# Patient Record
Sex: FEMALE | Race: WHITE | NOT HISPANIC OR LATINO | URBAN - METROPOLITAN AREA
[De-identification: names, ages, dates, MRNs, and addresses within clinical notes are randomized per-mention and may not be internally consistent; named-entity substitution may affect disease eponyms.]

---

## 2018-01-14 ENCOUNTER — EMERGENCY (EMERGENCY)
Facility: HOSPITAL | Age: 28
LOS: 0 days | Discharge: HOME | End: 2018-01-14
Attending: EMERGENCY MEDICINE | Admitting: EMERGENCY MEDICINE
Payer: MEDICAID

## 2018-01-14 VITALS
HEART RATE: 92 BPM | OXYGEN SATURATION: 100 % | DIASTOLIC BLOOD PRESSURE: 115 MMHG | SYSTOLIC BLOOD PRESSURE: 158 MMHG | TEMPERATURE: 98 F | RESPIRATION RATE: 19 BRPM

## 2018-01-14 VITALS — HEIGHT: 65 IN | WEIGHT: 287.92 LBS
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
## 2018-01-14 DIAGNOSIS — S93.602A UNSPECIFIED SPRAIN OF LEFT FOOT, INITIAL ENCOUNTER: ICD-10-CM
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
## 2018-01-14 DIAGNOSIS — M79.672 PAIN IN LEFT FOOT: ICD-10-CM

## 2018-01-14 PROCEDURE — 99284 EMERGENCY DEPT VISIT MOD MDM: CPT

## 2018-01-14 PROCEDURE — 73610 X-RAY EXAM OF ANKLE: CPT | Mod: 26,RT

## 2018-01-14 PROCEDURE — 73630 X-RAY EXAM OF FOOT: CPT | Mod: 26,LT

## 2018-01-14 RX ORDER — IBUPROFEN 200 MG
800 TABLET ORAL ONCE
Qty: 0 | Refills: 0 | Status: COMPLETED | OUTPATIENT
Start: 2018-01-14 | End: 2018-01-14

## 2018-01-14 RX ADMIN — Medication 800 MILLIGRAM(S): at 23:08

## 2018-01-14 NOTE — ED PROVIDER NOTE - OBJECTIVE STATEMENT
28 y/o female presents ED s/p podiatry surgery (08/25) with Dr. Sullivan c/o left foot pain. Pt states she was walking today when she heard a pop from her foot. Pain has progressively become worse throughout the day, and she is unable to walk on her foot. Pt is able to flex and point her foot, moving it left and right causes pain. denies fever. denies HA or neck pain. no chest pain or sob. no abd pain. no n/v/d. no urinary f/u/d. no back pain. no motor or sensory deficits. denies illicit drug use. no recent travel. no rash. no other acute issues symptoms or concerns. Pt has had pedal swelling on her left leg since having her surgery. Pt states a few months ago she thought she had a DVT, but when tested results were negative. Allergic to Tylenol.

## 2018-01-14 NOTE — ED ADULT NURSE REASSESSMENT NOTE - NS ED NURSE REASSESS COMMENT FT1
2310: Patient provided with crutches, education on crutch usage and follow up care preformed. Patient to be discharged.

## 2018-01-14 NOTE — ED PROVIDER NOTE - MEDICAL DECISION MAKING DETAILS
known prior fx f/u with [facility] podioatrist rice therapy crutched provided non weight bearing advised

## 2018-01-14 NOTE — ED PROVIDER NOTE - MUSCULOSKELETAL, MLM
Spine appears normal, range of motion is not limited. +Left mid foot TTP and swelling. No sign of DVT.

## 2018-01-14 NOTE — ED ADULT NURSE NOTE - NS ED NURSE DC INFO COMPLEXITY
Verbalized Understanding/Patient asked questions/Returned Demonstration/Moderate: Comprehensive teaching

## 2018-01-14 NOTE — ED ADULT NURSE NOTE - OBJECTIVE STATEMENT
27 year old female presenting to the ED complaining of pain to her left foot/ankle. Patient states that she had foot/ankle surgery in August, and has remained in a boot since then. Patient reports that she felt "my foot flex downward inside the boot, then I heard a 'pop,'" this morning. Patient states that she began to have increasing pain to her heel after this, with significant pain upon ambulation. Patient has full range of motion and circulation/motor/sensory function intact, but reports pain upon ROM of her left foot. Patient states she is unable to bear weight on her foot due to pain. Patient has tenderness to her posterior ankle/foot/heel upon palpation. Negative: Fevers, chills, recent travel, periods of immobility, redness, swelling, or warmth to touch.

## 2019-04-05 NOTE — ED ADULT NURSE NOTE - NS ED NOTE ABUSE SUSPICION NEGLECT YN
No no breast lump R/no nipple discharge R/no breast tenderness L/no breast tenderness R/no nipple discharge L/no breast lump L